# Patient Record
(demographics unavailable — no encounter records)

---

## 2017-04-06 NOTE — NUR
Patient discharged to home in stable conditon.  Written and verbal after care 
instructions provided, however patient refused written instructions of 
diagnosis. Patient verbalizes understanding of instructions. Perscription given 
to patient. Left unit at 2005

## 2017-07-23 NOTE — NUR
Patient discharged to home in stable conditon.  Written and verbal after care 
instructions given. 

Patient verbalizes understanding of instructions.PT WALKS IN STEADY GAIT. NO 
SIGN OF DISTRESS.

## 2017-07-23 NOTE — NUR
MED LIST REVIEWED WITH PATIENT. STATES SHE CANNOT RECALL DIABETIC MEDICATION AT 
THIS TIME, KNOWS THAT LANTUS HAD BEEN DISCONTINUED. PT STILL ALSO CANNOT 
REMEMBER HER PRN HEADACHE MEDICATION.